# Patient Record
Sex: FEMALE | Race: WHITE | NOT HISPANIC OR LATINO
[De-identification: names, ages, dates, MRNs, and addresses within clinical notes are randomized per-mention and may not be internally consistent; named-entity substitution may affect disease eponyms.]

---

## 2017-10-18 ENCOUNTER — RESULT REVIEW (OUTPATIENT)
Age: 37
End: 2017-10-18

## 2018-11-05 ENCOUNTER — RESULT REVIEW (OUTPATIENT)
Age: 38
End: 2018-11-05

## 2019-11-06 ENCOUNTER — RESULT REVIEW (OUTPATIENT)
Age: 39
End: 2019-11-06

## 2021-01-04 ENCOUNTER — RESULT REVIEW (OUTPATIENT)
Age: 41
End: 2021-01-04

## 2021-12-06 ENCOUNTER — OUTPATIENT (OUTPATIENT)
Dept: OUTPATIENT SERVICES | Facility: HOSPITAL | Age: 41
LOS: 1 days | End: 2021-12-06

## 2021-12-06 DIAGNOSIS — Z20.822 CONTACT WITH AND (SUSPECTED) EXPOSURE TO COVID-19: ICD-10-CM

## 2021-12-06 LAB — SARS-COV-2 RNA SPEC QL NAA+PROBE: SIGNIFICANT CHANGE UP

## 2022-05-23 ENCOUNTER — RESULT REVIEW (OUTPATIENT)
Age: 42
End: 2022-05-23

## 2022-07-06 ENCOUNTER — OUTPATIENT (OUTPATIENT)
Dept: OUTPATIENT SERVICES | Facility: HOSPITAL | Age: 42
LOS: 1 days | End: 2022-07-06
Payer: COMMERCIAL

## 2022-07-06 ENCOUNTER — APPOINTMENT (OUTPATIENT)
Dept: MAMMOGRAPHY | Facility: IMAGING CENTER | Age: 42
End: 2022-07-06

## 2022-07-06 ENCOUNTER — APPOINTMENT (OUTPATIENT)
Dept: ULTRASOUND IMAGING | Facility: IMAGING CENTER | Age: 42
End: 2022-07-06

## 2022-07-06 DIAGNOSIS — Z00.8 ENCOUNTER FOR OTHER GENERAL EXAMINATION: ICD-10-CM

## 2022-07-06 PROCEDURE — 77063 BREAST TOMOSYNTHESIS BI: CPT

## 2022-07-06 PROCEDURE — 77063 BREAST TOMOSYNTHESIS BI: CPT | Mod: 26

## 2022-07-06 PROCEDURE — 76641 ULTRASOUND BREAST COMPLETE: CPT

## 2022-07-06 PROCEDURE — 76641 ULTRASOUND BREAST COMPLETE: CPT | Mod: 26,50

## 2022-07-06 PROCEDURE — 77067 SCR MAMMO BI INCL CAD: CPT | Mod: 26

## 2022-07-06 PROCEDURE — 77067 SCR MAMMO BI INCL CAD: CPT

## 2023-05-07 ENCOUNTER — NON-APPOINTMENT (OUTPATIENT)
Age: 43
End: 2023-05-07

## 2023-05-08 ENCOUNTER — NON-APPOINTMENT (OUTPATIENT)
Age: 43
End: 2023-05-08

## 2023-05-08 ENCOUNTER — APPOINTMENT (OUTPATIENT)
Dept: ORTHOPEDIC SURGERY | Facility: CLINIC | Age: 43
End: 2023-05-08
Payer: COMMERCIAL

## 2023-05-08 VITALS — DIASTOLIC BLOOD PRESSURE: 78 MMHG | HEART RATE: 64 BPM | SYSTOLIC BLOOD PRESSURE: 114 MMHG

## 2023-05-08 VITALS — BODY MASS INDEX: 21.68 KG/M2 | WEIGHT: 127 LBS | HEIGHT: 64 IN

## 2023-05-08 DIAGNOSIS — Z78.9 OTHER SPECIFIED HEALTH STATUS: ICD-10-CM

## 2023-05-08 DIAGNOSIS — S93.402A SPRAIN OF UNSPECIFIED LIGAMENT OF LEFT ANKLE, INITIAL ENCOUNTER: ICD-10-CM

## 2023-05-08 PROCEDURE — 73610 X-RAY EXAM OF ANKLE: CPT | Mod: LT

## 2023-05-08 PROCEDURE — 99203 OFFICE O/P NEW LOW 30 MIN: CPT

## 2023-05-09 ENCOUNTER — APPOINTMENT (OUTPATIENT)
Dept: ORTHOPEDIC SURGERY | Facility: CLINIC | Age: 43
End: 2023-05-09
Payer: COMMERCIAL

## 2023-05-09 VITALS — HEIGHT: 64 IN | WEIGHT: 127 LBS | BODY MASS INDEX: 21.68 KG/M2

## 2023-05-09 DIAGNOSIS — M79.643 PAIN IN UNSPECIFIED HAND: ICD-10-CM

## 2023-05-09 PROBLEM — S93.402A LEFT ANKLE SPRAIN: Status: ACTIVE | Noted: 2023-05-09

## 2023-05-09 PROCEDURE — 99204 OFFICE O/P NEW MOD 45 MIN: CPT

## 2023-05-09 PROCEDURE — 73110 X-RAY EXAM OF WRIST: CPT | Mod: LT

## 2023-05-09 PROCEDURE — 73080 X-RAY EXAM OF ELBOW: CPT | Mod: RT

## 2023-05-09 NOTE — HISTORY OF PRESENT ILLNESS
[de-identified] : 42 year old female presents today with left ankle pain x 1 week. She fell on the street  inverting her ankle. She was seen at urgent care two days following the injury. The pain has improved brought on with lateral movements. She is taking Ibuprofen PRN which was helpful. This is the first inversion injury to the ankle. C/O tingling in the lateral aspect of the ankle and foot. Denies instability.\par \par The patient's past medical history, past surgical history, medications and allergies were reviewed by me today with the patient and documented accordingly. In addition, the patient's family and social history, which were noncontributory to this visit were reviewed also.\par

## 2023-05-09 NOTE — PHYSICAL EXAM
[de-identified] : Oriented to time, place, person\par Mood: Normal\par Affect: Normal\par Appearance: Healthy, well appearing, no acute distress.\par Gait: Normal\par Assistive Devices: None\par \par Left ankle exam\par \par Skin: Clean, dry, intact\par Inspection: No obvious malalignment, no swelling, no effusion\par Pulses: 2+ DP/PT pulses\par ROM: 5 degrees of dorsiflexion, 15 degrees of plantarflexion, normal subtalar motion. \par Tenderness: Minimal tenderness over the lateral malleolus, positive CFL/ATFL/PTFL pain. No medial malleolus pain, no deltoid ligament pain. No proximal fibular pain. No heel pain.\par Stability: Negative anterior drawer, negative posterior drawer.\par Strength: 5/5 TA/GS/EHL 5/5 inversion/eversion\par Neuro: In tact to light touch throughout\par Additional tests: Negative syndesmosis squeeze test. \par \par  [de-identified] : Images were reviewed from [facility] dated 5/3/23. \par \par Left ankle showed no evidence of bony injury, or eric dislocation. There is no underlying degenerative arthritic change seen. Overall alignment is maintained. Otherwise unremarkable.\par

## 2023-05-09 NOTE — DISCUSSION/SUMMARY
[de-identified] : 42-year-old female with left ankle sprain\par \par The patient presents with an acute inversion injury to the left ankle with an injury to the lateral ligamentous complex. I outlined a treatment protocol that will require a period of activity modification and relative rest. Further nonoperative modalities of treatment include relative rest from impact loading exercise, NSAID's/tylenol prn, cold compress for swelling control, compressive wraps/bracing, gradual return to weight bearing and load bearing exercise with or without the guidance of physical therapy for balance/proprioceptive/strength training.\par \par In addition, I discussed the spectrum of sprain injuries and short and long term outcomes. The majority of sprain respond well to nonoperative modalities of treatment, and the indication for surgical management is typically reserved for ankle joints that become chronically and grossly unstable.\par \par Recommendation: Ice/NSAIDs as needed.  Activity restriction/modification.  Supportive shoe wear..\par \par Follow up: 2 to 4 weeks as needed\par .

## 2023-05-09 NOTE — PHYSICAL EXAM
[de-identified] : Oriented to time, place, person\par Mood: Normal\par Affect: Normal\par Appearance: Healthy, well appearing, no acute distress.\par Gait: Normal\par Assistive Devices: None\par \par Left ankle exam\par \par Skin: Clean, dry, intact\par Inspection: No obvious malalignment, no swelling, no effusion\par Pulses: 2+ DP/PT pulses\par ROM: 5 degrees of dorsiflexion, 15 degrees of plantarflexion, normal subtalar motion. \par Tenderness: Minimal tenderness over the lateral malleolus, positive CFL/ATFL/PTFL pain. No medial malleolus pain, no deltoid ligament pain. No proximal fibular pain. No heel pain.\par Stability: Negative anterior drawer, negative posterior drawer.\par Strength: 5/5 TA/GS/EHL 5/5 inversion/eversion\par Neuro: In tact to light touch throughout\par Additional tests: Negative syndesmosis squeeze test. \par \par  [de-identified] : Images were reviewed from [facility] dated 5/3/23. \par \par Left ankle showed no evidence of bony injury, or eric dislocation. There is no underlying degenerative arthritic change seen. Overall alignment is maintained. Otherwise unremarkable.\par

## 2023-05-09 NOTE — DISCUSSION/SUMMARY
[de-identified] : 42-year-old female with left ankle sprain\par \par The patient presents with an acute inversion injury to the left ankle with an injury to the lateral ligamentous complex. I outlined a treatment protocol that will require a period of activity modification and relative rest. Further nonoperative modalities of treatment include relative rest from impact loading exercise, NSAID's/tylenol prn, cold compress for swelling control, compressive wraps/bracing, gradual return to weight bearing and load bearing exercise with or without the guidance of physical therapy for balance/proprioceptive/strength training.\par \par In addition, I discussed the spectrum of sprain injuries and short and long term outcomes. The majority of sprain respond well to nonoperative modalities of treatment, and the indication for surgical management is typically reserved for ankle joints that become chronically and grossly unstable.\par \par Recommendation: Ice/NSAIDs as needed.  Activity restriction/modification.  Supportive shoe wear..\par \par Follow up: 2 to 4 weeks as needed\par .

## 2023-05-09 NOTE — HISTORY OF PRESENT ILLNESS
[de-identified] : 42 year old female presents today with left ankle pain x 1 week. She fell on the street  inverting her ankle. She was seen at urgent care two days following the injury. The pain has improved brought on with lateral movements. She is taking Ibuprofen PRN which was helpful. This is the first inversion injury to the ankle. C/O tingling in the lateral aspect of the ankle and foot. Denies instability.\par \par The patient's past medical history, past surgical history, medications and allergies were reviewed by me today with the patient and documented accordingly. In addition, the patient's family and social history, which were noncontributory to this visit were reviewed also.\par

## 2023-05-31 ENCOUNTER — OUTPATIENT (OUTPATIENT)
Dept: OUTPATIENT SERVICES | Facility: HOSPITAL | Age: 43
LOS: 1 days | End: 2023-05-31
Payer: COMMERCIAL

## 2023-05-31 ENCOUNTER — APPOINTMENT (OUTPATIENT)
Dept: MRI IMAGING | Facility: CLINIC | Age: 43
End: 2023-05-31
Payer: COMMERCIAL

## 2023-05-31 DIAGNOSIS — M79.643 PAIN IN UNSPECIFIED HAND: ICD-10-CM

## 2023-05-31 PROCEDURE — 73221 MRI JOINT UPR EXTREM W/O DYE: CPT | Mod: 26,LT

## 2023-05-31 PROCEDURE — 73221 MRI JOINT UPR EXTREM W/O DYE: CPT

## 2023-06-07 ENCOUNTER — APPOINTMENT (OUTPATIENT)
Dept: ORTHOPEDIC SURGERY | Facility: CLINIC | Age: 43
End: 2023-06-07

## 2023-06-26 ENCOUNTER — APPOINTMENT (OUTPATIENT)
Dept: ORTHOPEDIC SURGERY | Facility: CLINIC | Age: 43
End: 2023-06-26

## 2023-06-30 ENCOUNTER — APPOINTMENT (OUTPATIENT)
Dept: ORTHOPEDIC SURGERY | Facility: CLINIC | Age: 43
End: 2023-06-30
Payer: COMMERCIAL

## 2023-06-30 PROCEDURE — G2012 BRIEF CHECK IN BY MD/QHP: CPT

## 2023-06-30 RX ORDER — MELOXICAM 15 MG/1
15 TABLET ORAL
Qty: 30 | Refills: 0 | Status: ACTIVE | COMMUNITY
Start: 2023-06-30 | End: 1900-01-01

## 2025-03-24 ENCOUNTER — NON-APPOINTMENT (OUTPATIENT)
Age: 45
End: 2025-03-24

## 2025-03-24 ENCOUNTER — APPOINTMENT (OUTPATIENT)
Facility: CLINIC | Age: 45
End: 2025-03-24

## 2025-03-24 ENCOUNTER — ASOB RESULT (OUTPATIENT)
Age: 45
End: 2025-03-24

## 2025-03-24 VITALS — SYSTOLIC BLOOD PRESSURE: 117 MMHG | DIASTOLIC BLOOD PRESSURE: 80 MMHG

## 2025-03-24 DIAGNOSIS — T83.9XXA UNSPECIFIED COMPLICATION OF GENITOURINARY PROSTHETIC DEVICE, IMPLANT AND GRAFT, INITIAL ENCOUNTER: ICD-10-CM

## 2025-03-24 DIAGNOSIS — Z01.411 ENCOUNTER FOR GYNECOLOGICAL EXAMINATION (GENERAL) (ROUTINE) WITH ABNORMAL FINDINGS: ICD-10-CM

## 2025-03-24 DIAGNOSIS — Z98.890 OTHER SPECIFIED POSTPROCEDURAL STATES: ICD-10-CM

## 2025-03-24 DIAGNOSIS — N92.1 EXCESSIVE AND FREQUENT MENSTRUATION WITH IRREGULAR CYCLE: ICD-10-CM

## 2025-03-24 DIAGNOSIS — Z92.89 PERSONAL HISTORY OF OTHER MEDICAL TREATMENT: ICD-10-CM

## 2025-03-24 LAB — HCG UR QL: NEGATIVE

## 2025-03-24 PROCEDURE — 99204 OFFICE O/P NEW MOD 45 MIN: CPT | Mod: 25

## 2025-03-24 PROCEDURE — 82270 OCCULT BLOOD FECES: CPT

## 2025-03-24 PROCEDURE — 76376 3D RENDER W/INTRP POSTPROCES: CPT

## 2025-03-24 PROCEDURE — 99386 PREV VISIT NEW AGE 40-64: CPT

## 2025-03-24 PROCEDURE — 99459 PELVIC EXAMINATION: CPT

## 2025-03-24 PROCEDURE — 76830 TRANSVAGINAL US NON-OB: CPT

## 2025-03-24 PROCEDURE — 81025 URINE PREGNANCY TEST: CPT

## 2025-03-24 RX ORDER — TRANEXAMIC ACID 650 MG/1
650 TABLET ORAL
Qty: 30 | Refills: 2 | Status: ACTIVE | COMMUNITY
Start: 2025-03-24 | End: 1900-01-01

## 2025-03-24 NOTE — PLAN
[FreeTextEntry1] : Patient here for an annual gynecological follow-up noting menorrhagia affecting her quality of life. Pap smear was done. Breast self-examination instructed. Mammogram and sonogram was ordered at [facility]. Options regarding removal and birth control options reviewed with patient in detail. As the patient was significantly heavy menstrual cycles will return to office for removal of ParaGard IUD Endo C and endometrial biopsy and insertion of Mirena IUD with ultrasound guidance. Nature and need explained the patient. All questions answered regarding the procedure and need for follow-up. Patient is vacationing in April and Lysteda was sent to the pharmacy to help mitigate her menstruation at that time.

## 2025-03-24 NOTE — HISTORY OF PRESENT ILLNESS
[FreeTextEntry1] : Patient is a 44-year-old with a last menstrual period of 3/16/2025 and for annual gynecological follow-up. Patient has a ParaGard IUD in situ since 7/28/2021. Over the past 4 to 6 months the patient notes prolonged and significantly heavy menstrual cycles. Her cycles are q. 28 days and they they were 4 to 5 days now 7 to 8 days with a significantly heavier flow for 4 to 5 days. Patient denies any history of anemia. Patient does have a clots, no accidents

## 2025-03-24 NOTE — PHYSICAL EXAM
[Chaperone Present] : A chaperone was present in the examining room during all aspects of the physical examination [Appropriately responsive] : appropriately responsive [Alert] : alert [No Acute Distress] : no acute distress [No Lymphadenopathy] : no lymphadenopathy [Soft] : soft [Non-tender] : non-tender [Non-distended] : non-distended [No HSM] : No HSM [No Lesions] : no lesions [No Mass] : no mass [Oriented x3] : oriented x3 [Examination Of The Breasts] : a normal appearance [Normal] : normal [No Masses] : no breast masses were palpable [IUD String] : an IUD string was noted [Retroversion] : retroverted [FreeTextEntry2] : marquita serna [FreeTextEntry9] : Guaiac negative no mass

## 2025-03-25 LAB
C TRACH RRNA SPEC QL NAA+PROBE: NOT DETECTED
HPV HIGH+LOW RISK DNA PNL CVX: NOT DETECTED
N GONORRHOEA RRNA SPEC QL NAA+PROBE: NOT DETECTED
SOURCE AMPLIFICATION: NORMAL

## 2025-03-30 LAB — CYTOLOGY CVX/VAG DOC THIN PREP: NORMAL

## 2025-04-23 ENCOUNTER — APPOINTMENT (OUTPATIENT)
Facility: CLINIC | Age: 45
End: 2025-04-23

## 2025-04-23 VITALS — SYSTOLIC BLOOD PRESSURE: 105 MMHG | DIASTOLIC BLOOD PRESSURE: 68 MMHG

## 2025-04-23 LAB — HCG UR QL: NEGATIVE

## 2025-04-23 PROCEDURE — ZZZZZ: CPT | Mod: 1L

## 2025-04-23 PROCEDURE — XXXXX: CPT | Mod: 1L

## 2025-04-23 NOTE — PROCEDURE
[TextEntry] : Procedure note: Preoperative diagnosis: Menorrhagia, anemia, ParaGard IUD in situ Postop diagnosis: Same Procedure: Hysteroscopy via Endo C and endometrial curettage with transabdominal ultrasound guidance.  Removal of ParaGard IUD and insertion of Mirena IUD. Procedure # : 1177 Surgeon: Dr. Demetrio Goncalves MD Assistants: Armida Rivas MA, Stephanie Marrero MA Complications: None Estimated blood loss: Minimal Anesthesia: Acetaminophen Findings: Normal endometrial cavity.  Uncomplicated procedure Plan of Management: 6-week IUD follow-up. Procedure: After informed consent the patient was brought to the procedure room and exam revealed an 8-week anteverted uterus, no palpable adnexal masses, palpable IUD string and along close posterior cervix.  A medium size speculum was placed vagina and the vagina was prepped with a Betadine solution. IUD string was visualized and the ParaGard IUD was removed with a ring forcep without difficulty.  A single-tooth tenaculum was then placed on the anterior lip of the cervix and the uterus was sounded to be the insertion of an os finder.  Transabdominal ultrasound was then performed and ultrasound guidance was used throughout the remainder of the procedure.  The hysteroscope was then assembled and primed appropriately and placed uterine cervix to the uterine fundus.  The uterus was progressively uncomfortably distended with instillation of sterile saline.  The uterus was visualized in its entirety and continuously and was within normal limits.  Both ostia were normal.  There are no intracavitary polyps or myomas visualized.  There is a small amount of clot consistent with removal of a ParaGard intrauterine device.  Hysteroscopy was then completed and the uterus was drained of saline and an endometrial curettage was performed with a suction aspiration curette and the aspirated curettings were sent for pathologic evaluation.  The suction aspiration curette may be used to sound the uterus to a depth of 9-1/2 cm.  The Mirena IUD was then loaded and placed under direct ultrasound visualization without difficulty.  Transabdominal ultrasonography confirmed appropriate placement.  All instruments removed Monsel's was applied to the cervix.  Patient was then observed for 15 minutes prior to discharge home with appropriate instructions and to return to office in 6 weeks for an IUD follow-up. Bleeding pattern and expectations with Mirena IUD reviewed with patient in detail.

## 2025-04-28 LAB — CORE LAB BIOPSY: NORMAL

## 2025-05-07 ENCOUNTER — APPOINTMENT (OUTPATIENT)
Facility: CLINIC | Age: 45
End: 2025-05-07
Payer: COMMERCIAL

## 2025-05-07 PROCEDURE — 99212 OFFICE O/P EST SF 10 MIN: CPT | Mod: 93

## 2025-05-19 ENCOUNTER — RESULT CHARGE (OUTPATIENT)
Age: 45
End: 2025-05-19

## 2025-05-19 ENCOUNTER — ASOB RESULT (OUTPATIENT)
Age: 45
End: 2025-05-19

## 2025-05-19 ENCOUNTER — APPOINTMENT (OUTPATIENT)
Facility: CLINIC | Age: 45
End: 2025-05-19
Payer: COMMERCIAL

## 2025-05-19 VITALS — SYSTOLIC BLOOD PRESSURE: 109 MMHG | DIASTOLIC BLOOD PRESSURE: 69 MMHG

## 2025-05-19 LAB
HCG UR QL: NEGATIVE
HEMOGLOBIN: 11.6

## 2025-05-19 PROCEDURE — 76830 TRANSVAGINAL US NON-OB: CPT

## 2025-05-19 PROCEDURE — 76376 3D RENDER W/INTRP POSTPROCES: CPT

## 2025-05-19 PROCEDURE — 99459 PELVIC EXAMINATION: CPT

## 2025-05-19 PROCEDURE — 99213 OFFICE O/P EST LOW 20 MIN: CPT | Mod: 25

## 2025-05-19 NOTE — DISCUSSION/SUMMARY
[FreeTextEntry1] :  All medical entries were at my, Dr. Demetrio abbott, direction. I have reviewed the chart and agree that the record accurately reflects my personal performance of the history, physical exam, assessment and plan. Our office Physicians assistant was present of the duration of the office visit.

## 2025-05-19 NOTE — CHIEF COMPLAINT
[Urgent Visit] : Urgent Visit [FreeTextEntry1] : 45-year-old female presents for urgent visit today complaining of standing on and off since Mirena IUD placement on 8/23/2025.  Patient denies any heavy bleeding with clots.  Patient denies any cramping.  Patient states bleeding is now lightening up.

## 2025-05-19 NOTE — PHYSICAL EXAM
[FreeTextEntry2] : Dana [Labia Majora] : labia major [Labia Minora] : labia minora [Normal] : clitoris [Scant] : there was scant vaginal bleeding [IUD String] : had an IUD string protruding out [Normal Position] : in a normal position [FreeTextEntry4] : Minimal brown staining noted at os. [FreeTextEntry5] : Positive IUD string is visible.

## 2025-05-19 NOTE — PROCEDURE
[Locate IUD] : locate IUD [Transvaginal Ultrasound] : transvaginal ultrasound [3D Ultrasound] : 3D ultrasound

## 2025-08-18 ENCOUNTER — APPOINTMENT (OUTPATIENT)
Facility: CLINIC | Age: 45
End: 2025-08-18

## 2025-09-10 ENCOUNTER — ASOB RESULT (OUTPATIENT)
Age: 45
End: 2025-09-10

## 2025-09-10 ENCOUNTER — APPOINTMENT (OUTPATIENT)
Facility: CLINIC | Age: 45
End: 2025-09-10

## 2025-09-10 VITALS — DIASTOLIC BLOOD PRESSURE: 77 MMHG | SYSTOLIC BLOOD PRESSURE: 133 MMHG

## 2025-09-10 DIAGNOSIS — T83.9XXA UNSPECIFIED COMPLICATION OF GENITOURINARY PROSTHETIC DEVICE, IMPLANT AND GRAFT, INITIAL ENCOUNTER: ICD-10-CM

## 2025-09-10 DIAGNOSIS — N92.1 EXCESSIVE AND FREQUENT MENSTRUATION WITH IRREGULAR CYCLE: ICD-10-CM

## 2025-09-10 LAB — HCG UR QL: NEGATIVE

## 2025-09-10 PROCEDURE — 76376 3D RENDER W/INTRP POSTPROCES: CPT

## 2025-09-10 PROCEDURE — 99214 OFFICE O/P EST MOD 30 MIN: CPT

## 2025-09-10 PROCEDURE — 76830 TRANSVAGINAL US NON-OB: CPT

## 2025-09-10 PROCEDURE — 81025 URINE PREGNANCY TEST: CPT

## 2025-09-10 PROCEDURE — 99459 PELVIC EXAMINATION: CPT
